# Patient Record
Sex: MALE | Race: AMERICAN INDIAN OR ALASKA NATIVE
[De-identification: names, ages, dates, MRNs, and addresses within clinical notes are randomized per-mention and may not be internally consistent; named-entity substitution may affect disease eponyms.]

---

## 2021-08-07 ENCOUNTER — HOSPITAL ENCOUNTER (EMERGENCY)
Dept: HOSPITAL 5 - ED | Age: 26
Discharge: HOME | End: 2021-08-07
Payer: COMMERCIAL

## 2021-08-07 VITALS — SYSTOLIC BLOOD PRESSURE: 138 MMHG | DIASTOLIC BLOOD PRESSURE: 84 MMHG

## 2021-08-07 DIAGNOSIS — R53.83: ICD-10-CM

## 2021-08-07 DIAGNOSIS — R51.9: ICD-10-CM

## 2021-08-07 DIAGNOSIS — M79.18: ICD-10-CM

## 2021-08-07 DIAGNOSIS — B34.9: Primary | ICD-10-CM

## 2021-08-07 LAB
ALBUMIN SERPL-MCNC: 4 G/DL (ref 3.9–5)
ALT SERPL-CCNC: 38 UNITS/L (ref 7–56)
BAND NEUTROPHILS # (MANUAL): 0 K/MM3
BUN SERPL-MCNC: 7 MG/DL (ref 9–20)
BUN/CREAT SERPL: 6 %
CALCIUM SERPL-MCNC: 8.2 MG/DL (ref 8.4–10.2)
HCT VFR BLD CALC: 48.6 % (ref 35.5–45.6)
HEMOLYSIS INDEX: 15
HGB BLD-MCNC: 16.3 GM/DL (ref 11.8–15.2)
MCHC RBC AUTO-ENTMCNC: 34 % (ref 32–34)
MCV RBC AUTO: 83 FL (ref 84–94)
MYELOCYTES # (MANUAL): 0 K/MM3
PLATELET # BLD: 138 K/MM3 (ref 140–440)
PROMYELOCYTES # (MANUAL): 0 K/MM3
RBC # BLD AUTO: 5.89 M/MM3 (ref 3.65–5.03)
TOTAL CELLS COUNTED BLD: 100

## 2021-08-07 PROCEDURE — 85007 BL SMEAR W/DIFF WBC COUNT: CPT

## 2021-08-07 PROCEDURE — 80053 COMPREHEN METABOLIC PANEL: CPT

## 2021-08-07 PROCEDURE — 96360 HYDRATION IV INFUSION INIT: CPT

## 2021-08-07 PROCEDURE — 85025 COMPLETE CBC W/AUTO DIFF WBC: CPT

## 2021-08-07 PROCEDURE — 99283 EMERGENCY DEPT VISIT LOW MDM: CPT

## 2021-08-07 PROCEDURE — 36415 COLL VENOUS BLD VENIPUNCTURE: CPT

## 2021-08-07 NOTE — EMERGENCY DEPARTMENT REPORT
ED General Adult HPI





- General


Chief complaint: Fever


Stated complaint: STOMACH PAIN,FATIGUE WEAKNESS


Time Seen by Provider: 08/07/21 15:48


Source: patient


Mode of arrival: Ambulatory


Limitations: No Limitations





- History of Present Illness


Initial comments: 


26-year-old male presents to the ER today with flulike/Covid-like symptoms.  

Patient states the symptoms started about 1 week ago.  Patient reports that he 

has had generalized fatigue, chills, headache, intermittent body aches, 

decreased appetite, intermittent abdominal cramps, and intermittent diaphoresis.

 He denies any apparent ill contacts or known COVID-19 contacts.  He denies any 

traveling.  He states that he is not taking a COVID-19 test since his symptoms 

started.  He has not taken COVID-19 vaccine.  He denies any cough, shortness of 

breath, rhinorrhea, nasal congestion, sore throat, UTI symptoms, nausea, 

vomiting or diarrhea.  He denies any underlying significant past medical his

tory.  He denies any tobacco abuse or illicit drug use.


MD Complaint: flu-like/COVID like symptoms 


-: week(s) (1)


Severity scale (0 -10): 3





- Related Data


                                  Previous Rx's











 Medication  Instructions  Recorded  Last Taken  Type


 


Ibuprofen [Motrin] 800 mg PO Q8HR PRN #30 tablet 08/07/21 Unknown Rx











                                    Allergies











Allergy/AdvReac Type Severity Reaction Status Date / Time


 


No Known Allergies Allergy   Unverified 08/07/21 13:57














ED Review of Systems


ROS: 


Stated complaint: STOMACH PAIN,FATIGUE WEAKNESS


Other details as noted in HPI





Comment: All other systems reviewed and negative


Constitutional: chills, fever (Subjective)


Eyes: denies: eye pain, eye discharge, vision change


ENT: denies: ear pain, throat pain, dental pain, hearing loss, epistaxis, 

congestion


Respiratory: denies: cough, shortness of breath, SOB with exertion, SOB at rest,

 wheezing


Cardiovascular: denies: chest pain, palpitations, dyspnea on exertion, 

orthopnea, edema, syncope, paroxysmal nocturnal dyspnea


Endocrine: no symptoms reported


Gastrointestinal: abdominal pain.  denies: nausea, vomiting, diarrhea, 

constipation, hematemesis, hematochezia


Genitourinary: denies: urgency, dysuria, frequency, hematuria, discharge, 

testicular pain, testicular mass


Musculoskeletal: myalgia.  denies: back pain, joint swelling, arthralgia


Skin: denies: rash, lesions, change in color, change in hair/nails, pruritus


Neurological: denies: headache, weakness, numbness, paresthesias, confusion, 

abnormal gait, vertigo


Psychiatric: denies: anxiety, depression, auditory hallucinations, visual 

hallucinations, homicidal thoughts, suicidal thoughts


Hematological/Lymphatic: denies: easy bleeding, easy bruising





ED Past Medical Hx





- Past Medical History


Previous Medical History?: No





- Surgical History


Past Surgical History?: No





- Medications


Home Medications: 


                                Home Medications











 Medication  Instructions  Recorded  Confirmed  Last Taken  Type


 


Ibuprofen [Motrin] 800 mg PO Q8HR PRN #30 tablet 08/07/21  Unknown Rx














ED Physical Exam





- General


Limitations: No Limitations


General appearance: alert, in no apparent distress





- Head


Head exam: Present: atraumatic, normocephalic, normal inspection





- Eye


Eye exam: Present: normal appearance, PERRL, EOMI


Pupils: Present: normal accommodation





- ENT


ENT exam: Present: normal orophraynx, mucous membranes dry, TM's normal 

bilaterally





- Neck


Neck exam: Present: normal inspection, full ROM.  Absent: meningismus





- Respiratory


Respiratory exam: Present: normal lung sounds bilaterally.  Absent: respiratory 

distress, wheezes, rales, rhonchi, stridor





- Cardiovascular


Cardiovascular Exam: Present: regular rate, normal rhythm, normal heart sounds





- GI/Abdominal


GI/Abdominal exam: Present: soft.  Absent: distended, tenderness, guarding, 

rebound





- Neurological Exam


Neurological exam: Present: alert, oriented X3, CN II-XII intact, normal gait





- Psychiatric


Psychiatric exam: Present: normal affect, normal mood





- Skin


Skin exam: Present: intact





ED Course


                                   Vital Signs











  08/07/21 08/07/21





  13:57 17:57


 


Temperature 101.4 F H 99.7 F H


 


Pulse Rate 91 H 84


 


Respiratory 16 16





Rate  


 


Blood Pressure 130/78 138/84





[Right]  


 


O2 Sat by Pulse 98 97





Oximetry  














ED Medical Decision Making





- Lab Data


Result diagrams: 


                                 08/07/21 16:20





                                 08/07/21 16:20





- Medical Decision Making


CBC and CMP reviewed.  CBC shows leukopenia with a white count of 2.9, but 

according to the absolute neutrophil calculator on MD Calc, patient is not 

neutropenic.  CMP unremarkable.  Patient reports feeling better after oral 

Tylenol and IV fluids.  He currently is not toxic or ill-appearing and not in 

any acute pain or respiratory distress.  He has no complaints of chest pain or 

shortness of breath.  He has a soft nontender abdomen.  He is repeat vitals 

after IV fluids and Tylenol.  Suspect patient symptoms likely related to a viral

 illness including COVID-19. His history, exam, diagnostic testing and current 

condition do not demonstrate an infectious process such as meningitis, severe 

pneumonia, acute respiratory distress syndrome, appendicitis, cholecystitis, 

bowel perforation, polynephritis, sepsis or other serious viral/bacterial 

infection requiring further testing, treatment, consultation or admission at 

this time.


Discussed all lab results with patient.  Recommend that he get an outpatient 

COVID-19 test and he leaves the ER.  Patient expressed understanding of all 

instructions and agree with plan.  Patient was stable at time of discharge.








Critical care attestation.: 


If time is entered above; I have spent that time in minutes in the direct care 

of this critically ill patient, excluding procedure time.








ED Disposition


Clinical Impression: 


 Viral illness, Suspected COVID-19 virus infection





Disposition: DC-01 TO HOME OR SELFCARE


Is pt being admited?: No


Does the pt Need Aspirin: No


Condition: Stable


Instructions:  COVID-19: How to Protect Yourself and Others - CDC, Viral 

Illness, Adult, Prevent the Spread of COVID-19 if You Are Sick - CDC


Additional Instructions: 


I recommend that you get an outpatient COVID-19 test when you leave the ER.  

Recommend that you quarantine until you get your test results.  I recommend that

 you take the ibuprofen as prescribed to help with pain and or fever.  You can 

also alternate with Tylenol.  Recommend he continue monitoring your 

temperatures.  Drink lots of fluids.  Follow-up closely with your primary care 

doctor.  Return to the ER if your symptoms changes or worsens in any way.


Prescriptions: 


Ibuprofen [Motrin] 800 mg PO Q8HR PRN #30 tablet


 PRN Reason: pain


Referrals: 


[facility] [Provider Group] - 3-5 Days


Forms:  Work/School Release Form(ED)


Time of Disposition: 18:06

## 2022-05-25 ENCOUNTER — HOSPITAL ENCOUNTER (EMERGENCY)
Dept: HOSPITAL 5 - ED | Age: 27
Discharge: LEFT BEFORE DISCHARGE | End: 2022-05-25
Payer: SELF-PAY

## 2022-05-25 DIAGNOSIS — R51.9: Primary | ICD-10-CM

## 2022-05-25 DIAGNOSIS — Z53.21: ICD-10-CM
